# Patient Record
Sex: FEMALE | Race: ASIAN | HISPANIC OR LATINO | URBAN - METROPOLITAN AREA
[De-identification: names, ages, dates, MRNs, and addresses within clinical notes are randomized per-mention and may not be internally consistent; named-entity substitution may affect disease eponyms.]

---

## 2019-07-18 ENCOUNTER — OFFICE VISIT (OUTPATIENT)
Dept: OBGYN CLINIC | Facility: CLINIC | Age: 59
End: 2019-07-18
Payer: COMMERCIAL

## 2019-07-18 VITALS
DIASTOLIC BLOOD PRESSURE: 86 MMHG | WEIGHT: 176.2 LBS | HEART RATE: 69 BPM | BODY MASS INDEX: 31.22 KG/M2 | SYSTOLIC BLOOD PRESSURE: 131 MMHG | HEIGHT: 63 IN

## 2019-07-18 DIAGNOSIS — R20.2 NUMBNESS AND TINGLING IN BOTH HANDS: Primary | ICD-10-CM

## 2019-07-18 DIAGNOSIS — R20.0 NUMBNESS AND TINGLING IN BOTH HANDS: Primary | ICD-10-CM

## 2019-07-18 PROCEDURE — 99203 OFFICE O/P NEW LOW 30 MIN: CPT | Performed by: ORTHOPAEDIC SURGERY

## 2019-07-18 RX ORDER — OXYCODONE HYDROCHLORIDE AND ACETAMINOPHEN 5; 325 MG/1; MG/1
TABLET ORAL
Refills: 0 | COMMUNITY
Start: 2019-07-09

## 2019-07-18 RX ORDER — CLONAZEPAM 0.5 MG/1
0.5 TABLET ORAL
Refills: 1 | COMMUNITY
Start: 2019-06-29

## 2019-07-18 RX ORDER — ATENOLOL 50 MG/1
50 TABLET ORAL DAILY
Refills: 3 | COMMUNITY
Start: 2019-07-09

## 2019-07-18 RX ORDER — LOSARTAN POTASSIUM 50 MG/1
TABLET ORAL
COMMUNITY

## 2019-07-18 RX ORDER — FLUOXETINE HYDROCHLORIDE 20 MG/1
20 CAPSULE ORAL DAILY
Refills: 1 | COMMUNITY
Start: 2019-06-23

## 2019-07-18 RX ORDER — NALOXONE HYDROCHLORIDE 4 MG/.1ML
SPRAY NASAL
Refills: 0 | COMMUNITY
Start: 2019-07-09

## 2019-07-18 RX ORDER — NAPROXEN 500 MG/1
500 TABLET ORAL 2 TIMES DAILY
Refills: 0 | COMMUNITY
Start: 2019-07-09

## 2019-07-18 RX ORDER — QUETIAPINE FUMARATE 100 MG/1
TABLET, FILM COATED ORAL
COMMUNITY

## 2019-07-18 RX ORDER — ALBUTEROL SULFATE 90 UG/1
AEROSOL, METERED RESPIRATORY (INHALATION)
COMMUNITY
Start: 2018-04-09

## 2019-07-18 NOTE — PROGRESS NOTES
CHIEF COMPLAINT:  No chief complaint on file  SUBJECTIVE:  Christopher Ackerman is a 61y o  year old RHD female who presents to the office for evaluation of her bilateral hands  Pt states that she is having bilateral pain numbness and tingling in both of her hands,  Pt states the tips of her fingers are the worst  Pt states that she has difficulty grasping objects  Pt states that the numbness and tingling are not constant  Pt states that she has pain in her hands that wakes her from sleep  Pt states that she has been wrapping her hands with ace wraps  Pt states that she has increased wrist pain when lifting, Pt states that 10 years ago she had been diagnosed with carpal tunnel and wore braces at night that helped relieve the symptoms at that time but they have returned about 3 weeks ago  PAST MEDICAL HISTORY:  Past Medical History:   Diagnosis Date    Anxiety     Asthma     Depression     Hypertension        PAST SURGICAL HISTORY:  Past Surgical History:   Procedure Laterality Date    GALLBLADDER SURGERY      HYSTERECTOMY         FAMILY HISTORY:  History reviewed  No pertinent family history  SOCIAL HISTORY:  Social History     Tobacco Use    Smoking status: Never Smoker    Smokeless tobacco: Never Used   Substance Use Topics    Alcohol use: Never     Frequency: Never    Drug use: Never       MEDICATIONS:    Current Outpatient Medications:     albuterol (VENTOLIN HFA) 90 mcg/act inhaler, INHALE TWO PUFFS BY MOUTH 4 TIMES DAILY, Disp: , Rfl:     atenolol (TENORMIN) 50 mg tablet, Take 50 mg by mouth daily, Disp: , Rfl: 3    clonazePAM (KlonoPIN) 0 5 mg tablet, Take 0 5 mg by mouth daily at bedtime, Disp: , Rfl: 1    FLUoxetine (PROzac) 20 mg capsule, Take 20 mg by mouth daily, Disp: , Rfl: 1    losartan (COZAAR) 50 mg tablet, 1 tab(s), Disp: , Rfl:     naproxen (NAPROSYN) 500 mg tablet, Take 500 mg by mouth 2 (two) times a day, Disp: , Rfl: 0    NARCAN 4 MG/0 1ML LIQD, CALL 911   ADMINISTER A SINGLE SPRAY INTRANASALLY INTO ONE NOSTRIL UPON SIGNS OF OPIOID OVERDOSE  MAY REPEAT AFTER 3 MINUTES IF NO RESPONSE , Disp: , Rfl: 0    oxyCODONE-acetaminophen (PERCOCET) 5-325 mg per tablet, TAKE 1 TO 2 TABLETS BY MOUTH EVERY 6 HOURS AS NEEDED   DO NOT EXCEED 6 TABLETS PER DAY , Disp: , Rfl: 0    QUEtiapine (SEROQUEL) 100 mg tablet, Take by mouth, Disp: , Rfl:     ALLERGIES:  Allergies   Allergen Reactions    Iodinated Diagnostic Agents Anaphylaxis    Meperidine Anaphylaxis, Angioedema and Rash       REVIEW OF SYSTEMS:  Review of Systems   Constitutional: Negative for chills, fever and unexpected weight change  HENT: Negative for hearing loss, nosebleeds and sore throat  Eyes: Negative for pain, redness and visual disturbance  Respiratory: Positive for shortness of breath  Negative for cough and wheezing  Cardiovascular: Negative for chest pain, palpitations and leg swelling  Gastrointestinal: Negative for abdominal pain, nausea and vomiting  Endocrine: Negative for polydipsia and polyuria  Genitourinary: Negative for dysuria and hematuria  Skin: Negative for rash and wound  Neurological: Negative for dizziness, numbness and headaches  Psychiatric/Behavioral: Positive for dysphoric mood  Negative for decreased concentration and suicidal ideas  The patient is nervous/anxious          VITALS:  Vitals:    07/18/19 1405   BP: 131/86   Pulse: 69       LABS:  HgA1c: No results found for: HGBA1C  BMP: No results found for: GLUCOSE, CALCIUM, NA, K, CO2, CL, BUN, CREATININE    _____________________________________________________  PHYSICAL EXAMINATION:  General: well developed and well nourished, alert, oriented times 3 and appears comfortable  Psychiatric: Normal  HEENT: Trachea Midline, No torticollis  Pulmonary: No audible wheezing or respiratory distress   Skin: No masses, erythema, lacerations, fluctation, ulcerations  Neurovascular: Sensation Intact to the Median, Ulnar, Radial Nerve, Motor Intact to the Median, Ulnar, Radial Nerve and Pulses Intact    MUSCULOSKELETAL EXAMINATION:  Bilateral Carpal Tunnel Exam:    Negative thenar atrophy  Positive phalen's test  Positive carpal tunnel compression  Positive tinels over median nerve at the wrist   Opposition strength 5/5  Abduction strength 5/5  Unable to complete  2pt discrimination           ___________________________________________________  STUDIES REVIEWED:  No studies reviewed  PROCEDURES PERFORMED:  Procedures  No Procedures performed today    _____________________________________________________  ASSESSMENT/PLAN:    Bilateral Carpal tunnel  *EDx was ordered  *Pt was advised to take B6   *Pt was provided with wrist brace to wear at night  *Pt will follow up in the office after EDx      Follow Up:  Return for MRI review        To Do Next Visit:  Re-evaluation of current issue          Scribe Attestation    I,:   Izaiah Lopez am acting as a scribe while in the presence of the attending physician :        I,:   Adelita Newman MD personally performed the services described in this documentation    as scribed in my presence :

## 2019-07-24 ENCOUNTER — TELEPHONE (OUTPATIENT)
Dept: NEUROLOGY | Facility: CLINIC | Age: 59
End: 2019-07-24

## 2019-07-24 ENCOUNTER — PROCEDURE VISIT (OUTPATIENT)
Dept: NEUROLOGY | Facility: CLINIC | Age: 59
End: 2019-07-24
Payer: COMMERCIAL

## 2019-07-24 DIAGNOSIS — R20.0 NUMBNESS AND TINGLING IN BOTH HANDS: ICD-10-CM

## 2019-07-24 DIAGNOSIS — R20.2 NUMBNESS AND TINGLING IN BOTH HANDS: ICD-10-CM

## 2019-07-24 PROCEDURE — 95911 NRV CNDJ TEST 9-10 STUDIES: CPT | Performed by: PHYSICAL MEDICINE & REHABILITATION

## 2019-07-24 PROCEDURE — 95886 MUSC TEST DONE W/N TEST COMP: CPT | Performed by: PHYSICAL MEDICINE & REHABILITATION

## 2019-07-24 NOTE — PROGRESS NOTES
EMG 2 Limb Upper Extremity     Date/Time 2019 12:34 PM     Performed by  Rito Apgar, MD     Authorized by Bennie Soria MD      Universal Protocol Consent: Verbal consent obtained  Risks and benefits: risks, benefits and alternatives were discussed  Consent given by: patient  Patient understanding: patient states understanding of the procedure being performed  Patient consent: the patient's understanding of the procedure matches consent given  Patient identity confirmed: verbally with patient               EMG REPORT  35-year-old female presents with numbness pain and tingling in both hands, right worse than the left  On physical examination, motor strength is 5/5 throughout  Sensations are diminished to light touch and pinprick in the bilateral median nerve distribution  The left median motor terminal latency was prolonged at 5 3 milliseconds with a normal compound motor action potential amplitude and a normal conduction velocity across the wrist   The right median motor terminal latency was prolonged at 5 6 milliseconds with a low compound motor action potential amplitude of 3 9 mV and a normal conduction velocity across the wrist   The bilateral ulnar motor conduction velocities and compound muscle action potentials were normal with normal distal latencies across the wrists  The left median sensory peak latency was prolonged at 4 8 milliseconds with a normal sensory action potential amplitude  The  right median sensory peak latency was prolonged at 4 8 millisecond with a  normal sensory action potential amplitude  The bilateral radial and ulnar sensory conduction velocity and sensory action potentials were also normal with normal distal latencies across the wrists  The left and right median and ulnar F wave latencies were within normal limits      Concentric needle EMG was performed various proximal and distal muscles of the bilateral upper extremities including deltoid, biceps, triceps, pronator teres, apb, FDI and low cervical paraspinals  There was no evidence of active denervation in any of the muscles tested  Moderate decreased recruitment of giant motor units was noted in the right abductor pollicis brevis  And mild decreased recruitment was noted in the left abductor pollicis brevis  Early recruited motor units appear normal with recruitment patterns being  full or full for effort in the remaining muscles tested  INTERPRETATION: There is electrophysiologic evidence of a:    1  Bilateral median nerve compression neuropathy at the wrist with demyelinative and  axonal changes on the right and predominant demyelinative changes on the left, consistent with a diagnosis of carpal tunnel syndrome  2  There is no evidence of a cervical radiculopathy or ulnar neuropathy bilaterally  Clinical correlation is recommended       RAEANN Su

## 2019-07-25 ENCOUNTER — TRANSCRIBE ORDERS (OUTPATIENT)
Dept: NEUROLOGY | Facility: CLINIC | Age: 59
End: 2019-07-25

## 2019-07-25 DIAGNOSIS — R20.0 ANESTHESIA OF SKIN: Primary | ICD-10-CM

## 2019-08-01 ENCOUNTER — TELEPHONE (OUTPATIENT)
Dept: NEUROLOGY | Facility: CLINIC | Age: 59
End: 2019-08-01

## 2019-08-01 NOTE — TELEPHONE ENCOUNTER
Called patient to see if she would be available to come in tomorrow at 330 to see Dr Misa Jennings  Patient is on wait list for sooner appointment  No answer  Left voicemail

## 2019-09-17 ENCOUNTER — TELEPHONE (OUTPATIENT)
Dept: NEUROLOGY | Facility: CLINIC | Age: 59
End: 2019-09-17

## 2019-09-17 NOTE — TELEPHONE ENCOUNTER
Called patient to see if she would be available to come in tomorrow September 18 at 1130 to see Dr Arun Hernandze  No answer  Left voicemail

## 2019-10-31 ENCOUNTER — SPOT CHECK NEW (OUTPATIENT)
Dept: URBAN - METROPOLITAN AREA CLINIC 11 | Facility: CLINIC | Age: 59
Setting detail: DERMATOLOGY
End: 2019-10-31

## 2019-10-31 DIAGNOSIS — L23.9 ALLERGIC CONTACT DERMATITIS, UNSPECIFIED CAUSE: ICD-10-CM

## 2019-10-31 DIAGNOSIS — Z01.82 ENCOUNTER FOR ALLERGY TESTING: ICD-10-CM

## 2019-10-31 PROCEDURE — 11102 TANGNTL BX SKIN SINGLE LES: CPT

## 2019-10-31 PROCEDURE — 99202 OFFICE O/P NEW SF 15 MIN: CPT

## 2019-11-05 ENCOUNTER — RX ONLY (RX ONLY)
Age: 59
End: 2019-11-05

## 2019-11-05 RX ORDER — TRETINOIN 0.5 MG/G
1 APPLICATION CREAM TOPICAL NIGHTLY
Qty: 45 | Refills: 2
Start: 2019-11-05

## 2021-02-24 ENCOUNTER — PATIENT MESSAGE (OUTPATIENT)
Dept: GASTROENTEROLOGY | Facility: CLINIC | Age: 61
End: 2021-02-24

## 2021-06-23 ENCOUNTER — OFFICE VISIT (OUTPATIENT)
Dept: UROLOGY | Facility: CLINIC | Age: 61
End: 2021-06-23
Payer: COMMERCIAL

## 2021-06-23 VITALS
SYSTOLIC BLOOD PRESSURE: 130 MMHG | HEART RATE: 80 BPM | BODY MASS INDEX: 34.15 KG/M2 | WEIGHT: 200 LBS | DIASTOLIC BLOOD PRESSURE: 88 MMHG | HEIGHT: 64 IN

## 2021-06-23 DIAGNOSIS — N32.81 OVERACTIVE BLADDER: Primary | ICD-10-CM

## 2021-06-23 LAB
BACTERIA UR QL AUTO: ABNORMAL /HPF
BILIRUB UR QL STRIP: NEGATIVE
CLARITY UR: ABNORMAL
COLOR UR: ABNORMAL
GLUCOSE UR STRIP-MCNC: NEGATIVE MG/DL
HGB UR QL STRIP.AUTO: ABNORMAL
HYALINE CASTS #/AREA URNS LPF: ABNORMAL /LPF
KETONES UR STRIP-MCNC: NEGATIVE MG/DL
LEUKOCYTE ESTERASE UR QL STRIP: NEGATIVE
NITRITE UR QL STRIP: NEGATIVE
NON-SQ EPI CELLS URNS QL MICRO: ABNORMAL /HPF
PH UR STRIP.AUTO: 6 [PH]
POST-VOID RESIDUAL VOLUME, ML POC: 30 ML
PROT UR STRIP-MCNC: ABNORMAL MG/DL
RBC #/AREA URNS AUTO: ABNORMAL /HPF
SP GR UR STRIP.AUTO: 1.03 (ref 1–1.03)
UROBILINOGEN UR QL STRIP.AUTO: 0.2 E.U./DL
WBC #/AREA URNS AUTO: ABNORMAL /HPF

## 2021-06-23 PROCEDURE — 51798 US URINE CAPACITY MEASURE: CPT | Performed by: PHYSICIAN ASSISTANT

## 2021-06-23 PROCEDURE — 99243 OFF/OP CNSLTJ NEW/EST LOW 30: CPT | Performed by: PHYSICIAN ASSISTANT

## 2021-06-23 PROCEDURE — 81001 URINALYSIS AUTO W/SCOPE: CPT | Performed by: PHYSICIAN ASSISTANT

## 2021-06-23 RX ORDER — OXYBUTYNIN CHLORIDE 10 MG/1
10 TABLET, EXTENDED RELEASE ORAL
Qty: 30 TABLET | Refills: 3 | Status: SHIPPED | OUTPATIENT
Start: 2021-06-23

## 2021-06-23 RX ORDER — LOSARTAN POTASSIUM 25 MG/1
25 TABLET ORAL DAILY
COMMUNITY
Start: 2021-05-13

## 2021-06-23 RX ORDER — TOPIRAMATE 50 MG/1
50 TABLET, FILM COATED ORAL 2 TIMES DAILY
COMMUNITY
Start: 2021-03-25

## 2021-06-23 RX ORDER — IBUPROFEN 800 MG/1
TABLET ORAL
COMMUNITY
Start: 2021-06-10

## 2021-06-23 RX ORDER — TRAZODONE HYDROCHLORIDE 50 MG/1
TABLET ORAL
COMMUNITY
Start: 2021-03-25

## 2021-06-23 NOTE — PROGRESS NOTES
1  Overactive bladder  POCT Measure PVR    Urinalysis with microscopic       Assessment and plan:       1  Overactive bladder  - we discussed conservative measures to minimize irritative voiding symptoms including hydration, avoidance of constipation, and minimizing bladder irritants  -   Patient is on 2 antihypertensive medications and wishes to avoid beta 3 agonist due to the risk of increasing her blood pressure  We will trial oxybutynin 10 mg extended release daily  Use and side effect profile reviewed  Follow-up 3 months for PVR reassessment   -   Patient will go for urinalysis with microscopy within the week to ensure no underlying evidence of infection or hematuria as obscuring factors  She will be contacted with any abnormalities  Deena Church PA-C      Chief Complaint     Chief Complaint   Patient presents with    OAB       History of Present Illness     Atif Jauregui is a 64 y o  Female presenting today as a new patient with urinary frequency  Patient reports that over the past year she has had significant increase in urinary frequency and urgency  She is experiencing some urge urinary incontinence requiring multiple pads daily  She finds this very disruptive to her daily routine  Denies any dysuria, gross hematuria, flank pain, fevers, or chills  Does have occasional suprapubic pressure  Reports that she is having regular bowel movements however they are more firm than usual       Patient denies any previous history of  surgical manipulation  Patient had a urine test 12/03/2020 which did reveal some evidence of possible hematuria however microscopy was not obtained  Past surgical history of cholecystectomy and hysterectomy  PVR 30mL  Urinary Incontinence Screening      Most Recent Value   Urinary Incontinence   Urinary Incontinence? Yes   Incomplete emptying? Yes   Urinary frequency? Yes   Urinary urgency? Yes   Urinary hesitancy?   Yes   Dysuria (painful difficult urination)? No   Nocturia (waking up to use the bathroom)? Yes [3-4 times]   Straining (having to push to go)? No   Weak stream?  Yes   Intermittent stream?  Yes   Post void dribbling? Yes   Vaginal pressure? No   Vaginal dryness? Yes            Review of Systems     Review of Systems   Constitutional: Negative for activity change, appetite change, chills, diaphoresis, fatigue, fever and unexpected weight change  Respiratory: Negative for chest tightness and shortness of breath  Cardiovascular: Negative for chest pain, palpitations and leg swelling  Gastrointestinal: Negative for abdominal distention, abdominal pain, constipation, diarrhea, nausea and vomiting  Genitourinary: Positive for frequency and urgency  Negative for decreased urine volume, difficulty urinating, dysuria, enuresis, flank pain, genital sores and hematuria  Musculoskeletal: Negative for back pain, gait problem and myalgias  Skin: Negative for color change, pallor, rash and wound  Psychiatric/Behavioral: Negative for behavioral problems  The patient is not nervous/anxious  Allergies     Allergies   Allergen Reactions    Iodinated Diagnostic Agents Anaphylaxis    Meperidine Anaphylaxis, Angioedema and Rash       Physical Exam     Physical Exam  Constitutional:       General: She is not in acute distress  Appearance: She is normal weight  She is not ill-appearing, toxic-appearing or diaphoretic  HENT:      Head: Normocephalic and atraumatic  Eyes:      General:         Right eye: No discharge  Left eye: No discharge  Conjunctiva/sclera: Conjunctivae normal    Pulmonary:      Effort: Pulmonary effort is normal  No respiratory distress  Musculoskeletal:         General: Normal range of motion  Skin:     General: Skin is warm and dry  Coloration: Skin is not jaundiced or pale  Neurological:      General: No focal deficit present        Mental Status: She is alert and oriented to person, place, and time  Psychiatric:         Mood and Affect: Mood normal          Behavior: Behavior normal          Thought Content: Thought content normal          Vital Signs     Vitals:    06/23/21 1150   BP: 130/88   Pulse: 80   Weight: 90 7 kg (200 lb)   Height: 5' 4" (1 626 m)         Current Medications       Current Outpatient Medications:     albuterol (VENTOLIN HFA) 90 mcg/act inhaler, INHALE TWO PUFFS BY MOUTH 4 TIMES DAILY, Disp: , Rfl:     atenolol (TENORMIN) 50 mg tablet, Take 50 mg by mouth daily, Disp: , Rfl: 3    clonazePAM (KlonoPIN) 0 5 mg tablet, Take 0 5 mg by mouth daily at bedtime, Disp: , Rfl: 1    FLUoxetine (PROzac) 20 mg capsule, Take 20 mg by mouth daily, Disp: , Rfl: 1    ibuprofen (MOTRIN) 800 mg tablet, TAKE 1 TABLET BY MOUTH EVERY 8 HOURS AS NEEDED FOR MILD PAIN (PAIN SCORE 1 3)  , Disp: , Rfl:     losartan (COZAAR) 50 mg tablet, 1 tab(s), Disp: , Rfl:     NARCAN 4 MG/0 1ML LIQD, CALL 911  ADMINISTER A SINGLE SPRAY INTRANASALLY INTO ONE NOSTRIL UPON SIGNS OF OPIOID OVERDOSE  MAY REPEAT AFTER 3 MINUTES IF NO RESPONSE , Disp: , Rfl: 0    oxyCODONE-acetaminophen (PERCOCET) 5-325 mg per tablet, TAKE 1 TO 2 TABLETS BY MOUTH EVERY 6 HOURS AS NEEDED   DO NOT EXCEED 6 TABLETS PER DAY , Disp: , Rfl: 0    topiramate (TOPAMAX) 50 MG tablet, Take 50 mg by mouth 2 (two) times a day, Disp: , Rfl:     traZODone (DESYREL) 50 mg tablet, TAKE 1 TO 2 TABLETS BY MOUTH AT BEDTIME AS NEEDED FOR SLEEP, Disp: , Rfl:     losartan (COZAAR) 25 mg tablet, Take 25 mg by mouth daily (Patient not taking: Reported on 6/23/2021), Disp: , Rfl:     naproxen (NAPROSYN) 500 mg tablet, Take 500 mg by mouth 2 (two) times a day (Patient not taking: Reported on 6/23/2021), Disp: , Rfl: 0    QUEtiapine (SEROQUEL) 100 mg tablet, Take by mouth (Patient not taking: Reported on 6/23/2021), Disp: , Rfl:       Active Problems     There is no problem list on file for this patient          Past Medical History Past Medical History:   Diagnosis Date    Anxiety     Asthma     Depression     Hypertension     OAB (overactive bladder)          Surgical History     Past Surgical History:   Procedure Laterality Date    GALLBLADDER SURGERY      HYSTERECTOMY           Family History     Family History   Problem Relation Age of Onset    Brain cancer Father     Heart disease Mother     Breast cancer Sister          Social History     Social History       Radiology

## 2023-09-22 ENCOUNTER — TELEPHONE (OUTPATIENT)
Age: 63
End: 2023-09-22

## 2023-09-22 NOTE — TELEPHONE ENCOUNTER
Caller:  patient    Doctor:     Reason for call: pt called to schedule an appt for her back.   She is going to call back once she has her records    Call back#:

## 2023-10-10 ENCOUNTER — TELEPHONE (OUTPATIENT)
Age: 63
End: 2023-10-10

## 2023-10-10 NOTE — TELEPHONE ENCOUNTER
Caller: Mia Perdomo, pt    Doctor: Adrian Meza    Reason for call: pt called statins she had not received her new patient paperwork in the mail.   Pt was advised to arrive 30 minutes early and she could complete in office

## 2023-10-10 NOTE — TELEPHONE ENCOUNTER
Caller: Patient     Doctor/Office: [facility]PA    Call regarding :  SPA    Call was transferred to: [facility]
 used